# Patient Record
Sex: MALE | Race: WHITE | Employment: UNEMPLOYED | URBAN - METROPOLITAN AREA
[De-identification: names, ages, dates, MRNs, and addresses within clinical notes are randomized per-mention and may not be internally consistent; named-entity substitution may affect disease eponyms.]

---

## 2018-03-16 ENCOUNTER — APPOINTMENT (OUTPATIENT)
Dept: CT IMAGING | Age: 58
DRG: 136 | End: 2018-03-16
Payer: COMMERCIAL

## 2018-03-16 ENCOUNTER — HOSPITAL ENCOUNTER (INPATIENT)
Age: 58
LOS: 2 days | Discharge: HOSPICE | DRG: 136 | End: 2018-03-19
Attending: EMERGENCY MEDICINE | Admitting: INTERNAL MEDICINE
Payer: COMMERCIAL

## 2018-03-16 ENCOUNTER — APPOINTMENT (OUTPATIENT)
Dept: GENERAL RADIOLOGY | Age: 58
DRG: 136 | End: 2018-03-16
Payer: COMMERCIAL

## 2018-03-16 DIAGNOSIS — C79.9 METASTATIC CANCER (HCC): ICD-10-CM

## 2018-03-16 DIAGNOSIS — M89.8X9 BONE PAIN: ICD-10-CM

## 2018-03-16 DIAGNOSIS — A41.9: Primary | ICD-10-CM

## 2018-03-16 DIAGNOSIS — E87.6 HYPOKALEMIA: ICD-10-CM

## 2018-03-16 DIAGNOSIS — R64 WASTING SYNDROME (HCC): ICD-10-CM

## 2018-03-16 DIAGNOSIS — B20: Primary | ICD-10-CM

## 2018-03-16 DIAGNOSIS — E83.42 HYPOMAGNESEMIA: ICD-10-CM

## 2018-03-16 DIAGNOSIS — E87.20 LACTIC ACID INCREASED: ICD-10-CM

## 2018-03-16 DIAGNOSIS — I95.9 HYPOTENSION, UNSPECIFIED HYPOTENSION TYPE: ICD-10-CM

## 2018-03-16 LAB
% CKMB: 2.8 % (ref 0–3.5)
ABSOLUTE EOS #: 0 K/UL (ref 0–0.4)
ABSOLUTE IMMATURE GRANULOCYTE: ABNORMAL K/UL (ref 0–0.3)
ABSOLUTE LYMPH #: 2.4 K/UL (ref 1–4.8)
ABSOLUTE MONO #: 0.8 K/UL (ref 0.2–0.8)
ALBUMIN SERPL-MCNC: 2.5 G/DL (ref 3.5–5.2)
ALBUMIN/GLOBULIN RATIO: ABNORMAL (ref 1–2.5)
ALP BLD-CCNC: 299 U/L (ref 40–129)
ALT SERPL-CCNC: 57 U/L (ref 5–41)
AMMONIA: 39 UMOL/L (ref 16–60)
ANION GAP SERPL CALCULATED.3IONS-SCNC: 16 MMOL/L (ref 9–17)
AST SERPL-CCNC: 98 U/L
BASOPHILS # BLD: 0 % (ref 0–2)
BASOPHILS ABSOLUTE: 0 K/UL (ref 0–0.2)
BILIRUB SERPL-MCNC: 0.41 MG/DL (ref 0.3–1.2)
BILIRUBIN DIRECT: 0.19 MG/DL
BILIRUBIN, INDIRECT: 0.22 MG/DL (ref 0–1)
BUN BLDV-MCNC: 5 MG/DL (ref 6–20)
BUN/CREAT BLD: 11 (ref 9–20)
CALCIUM SERPL-MCNC: 7.1 MG/DL (ref 8.6–10.4)
CHLORIDE BLD-SCNC: 88 MMOL/L (ref 98–107)
CK MB: 1.6 NG/ML
CKMB INTERPRETATION: NORMAL
CO2: 25 MMOL/L (ref 20–31)
CREAT SERPL-MCNC: 0.45 MG/DL (ref 0.7–1.2)
DIFFERENTIAL TYPE: ABNORMAL
EKG ATRIAL RATE: 77 BPM
EKG P AXIS: 72 DEGREES
EKG P-R INTERVAL: 152 MS
EKG Q-T INTERVAL: 438 MS
EKG QRS DURATION: 104 MS
EKG QTC CALCULATION (BAZETT): 495 MS
EKG R AXIS: 83 DEGREES
EKG T AXIS: 74 DEGREES
EKG VENTRICULAR RATE: 77 BPM
EOSINOPHILS RELATIVE PERCENT: 0 % (ref 1–4)
GFR AFRICAN AMERICAN: >60 ML/MIN
GFR NON-AFRICAN AMERICAN: >60 ML/MIN
GFR SERPL CREATININE-BSD FRML MDRD: ABNORMAL ML/MIN/{1.73_M2}
GFR SERPL CREATININE-BSD FRML MDRD: ABNORMAL ML/MIN/{1.73_M2}
GLOBULIN: ABNORMAL G/DL (ref 1.5–3.8)
GLUCOSE BLD-MCNC: 74 MG/DL (ref 70–99)
HCT VFR BLD CALC: 27.3 % (ref 41–53)
HEMOGLOBIN: 9 G/DL (ref 13.5–17.5)
IMMATURE GRANULOCYTES: ABNORMAL %
LACTIC ACID: 4.6 MMOL/L (ref 0.5–2.2)
LYMPHOCYTES # BLD: 17 % (ref 24–44)
MAGNESIUM: 1.3 MG/DL (ref 1.6–2.6)
MCH RBC QN AUTO: 33.3 PG (ref 26–34)
MCHC RBC AUTO-ENTMCNC: 32.8 G/DL (ref 31–37)
MCV RBC AUTO: 101.3 FL (ref 80–100)
MONOCYTES # BLD: 6 % (ref 1–7)
MYOGLOBIN: 42 NG/ML (ref 28–72)
NRBC AUTOMATED: ABNORMAL PER 100 WBC
PDW BLD-RTO: 14.9 % (ref 11.5–14.5)
PHOSPHORUS: 1.8 MG/DL (ref 2.5–4.5)
PLATELET # BLD: 342 K/UL (ref 130–400)
PLATELET ESTIMATE: ABNORMAL
PMV BLD AUTO: 5.8 FL (ref 6–12)
POTASSIUM SERPL-SCNC: 2.4 MMOL/L (ref 3.7–5.3)
RBC # BLD: 2.69 M/UL (ref 4.5–5.9)
RBC # BLD: ABNORMAL 10*6/UL
SEG NEUTROPHILS: 77 % (ref 36–66)
SEGMENTED NEUTROPHILS ABSOLUTE COUNT: 10.5 K/UL (ref 1.8–7.7)
SODIUM BLD-SCNC: 129 MMOL/L (ref 135–144)
TOTAL CK: 58 U/L (ref 39–308)
TOTAL PROTEIN: 5 G/DL (ref 6.4–8.3)
TROPONIN INTERP: NORMAL
TROPONIN T: <0.03 NG/ML
WBC # BLD: 13.8 K/UL (ref 3.5–11)
WBC # BLD: ABNORMAL 10*3/UL

## 2018-03-16 PROCEDURE — 2580000003 HC RX 258: Performed by: NURSE PRACTITIONER

## 2018-03-16 PROCEDURE — 96365 THER/PROPH/DIAG IV INF INIT: CPT

## 2018-03-16 PROCEDURE — 84100 ASSAY OF PHOSPHORUS: CPT

## 2018-03-16 PROCEDURE — 83735 ASSAY OF MAGNESIUM: CPT

## 2018-03-16 PROCEDURE — 72125 CT NECK SPINE W/O DYE: CPT

## 2018-03-16 PROCEDURE — 82550 ASSAY OF CK (CPK): CPT

## 2018-03-16 PROCEDURE — 84484 ASSAY OF TROPONIN QUANT: CPT

## 2018-03-16 PROCEDURE — 05H333Z INSERTION OF INFUSION DEVICE INTO RIGHT INNOMINATE VEIN, PERCUTANEOUS APPROACH: ICD-10-PCS | Performed by: EMERGENCY MEDICINE

## 2018-03-16 PROCEDURE — 6360000002 HC RX W HCPCS: Performed by: NURSE PRACTITIONER

## 2018-03-16 PROCEDURE — 82140 ASSAY OF AMMONIA: CPT

## 2018-03-16 PROCEDURE — 80076 HEPATIC FUNCTION PANEL: CPT

## 2018-03-16 PROCEDURE — 96375 TX/PRO/DX INJ NEW DRUG ADDON: CPT

## 2018-03-16 PROCEDURE — 83874 ASSAY OF MYOGLOBIN: CPT

## 2018-03-16 PROCEDURE — 71045 X-RAY EXAM CHEST 1 VIEW: CPT

## 2018-03-16 PROCEDURE — 85025 COMPLETE CBC W/AUTO DIFF WBC: CPT

## 2018-03-16 PROCEDURE — 80048 BASIC METABOLIC PNL TOTAL CA: CPT

## 2018-03-16 PROCEDURE — 99285 EMERGENCY DEPT VISIT HI MDM: CPT

## 2018-03-16 PROCEDURE — 82553 CREATINE MB FRACTION: CPT

## 2018-03-16 PROCEDURE — 70450 CT HEAD/BRAIN W/O DYE: CPT

## 2018-03-16 PROCEDURE — 6360000002 HC RX W HCPCS: Performed by: EMERGENCY MEDICINE

## 2018-03-16 PROCEDURE — 93005 ELECTROCARDIOGRAM TRACING: CPT

## 2018-03-16 PROCEDURE — 83605 ASSAY OF LACTIC ACID: CPT

## 2018-03-16 PROCEDURE — 87040 BLOOD CULTURE FOR BACTERIA: CPT

## 2018-03-16 RX ORDER — MAGNESIUM OXIDE 400 MG/1
400 TABLET ORAL DAILY
COMMUNITY

## 2018-03-16 RX ORDER — MAGNESIUM SULFATE 1 G/100ML
1 INJECTION INTRAVENOUS
Status: DISPENSED | OUTPATIENT
Start: 2018-03-16 | End: 2018-03-17

## 2018-03-16 RX ORDER — DEXAMETHASONE 2 MG/1
2 TABLET ORAL 2 TIMES DAILY WITH MEALS
Status: ON HOLD | COMMUNITY
End: 2018-03-19

## 2018-03-16 RX ORDER — 0.9 % SODIUM CHLORIDE 0.9 %
30 INTRAVENOUS SOLUTION INTRAVENOUS ONCE
Status: COMPLETED | OUTPATIENT
Start: 2018-03-16 | End: 2018-03-17

## 2018-03-16 RX ORDER — POTASSIUM CHLORIDE 7.45 MG/ML
10 INJECTION INTRAVENOUS ONCE
Status: COMPLETED | OUTPATIENT
Start: 2018-03-16 | End: 2018-03-17

## 2018-03-16 RX ORDER — 0.9 % SODIUM CHLORIDE 0.9 %
500 INTRAVENOUS SOLUTION INTRAVENOUS ONCE
Status: DISCONTINUED | OUTPATIENT
Start: 2018-03-16 | End: 2018-03-16

## 2018-03-16 RX ORDER — OMEPRAZOLE 20 MG/1
20 CAPSULE, DELAYED RELEASE ORAL DAILY
COMMUNITY

## 2018-03-16 RX ORDER — POTASSIUM BICARBONATE 25 MEQ/1
25 TABLET, EFFERVESCENT ORAL ONCE
Status: DISCONTINUED | OUTPATIENT
Start: 2018-03-16 | End: 2018-03-19 | Stop reason: HOSPADM

## 2018-03-16 RX ORDER — FENTANYL CITRATE 50 UG/ML
50 INJECTION, SOLUTION INTRAMUSCULAR; INTRAVENOUS ONCE
Status: COMPLETED | OUTPATIENT
Start: 2018-03-16 | End: 2018-03-16

## 2018-03-16 RX ORDER — POTASSIUM CHLORIDE AND SODIUM CHLORIDE 900; 300 MG/100ML; MG/100ML
INJECTION, SOLUTION INTRAVENOUS CONTINUOUS
Status: DISCONTINUED | OUTPATIENT
Start: 2018-03-16 | End: 2018-03-19 | Stop reason: HOSPADM

## 2018-03-16 RX ORDER — ONDANSETRON 2 MG/ML
4 INJECTION INTRAMUSCULAR; INTRAVENOUS ONCE
Status: COMPLETED | OUTPATIENT
Start: 2018-03-16 | End: 2018-03-16

## 2018-03-16 RX ORDER — POTASSIUM CHLORIDE 750 MG/1
10 CAPSULE, EXTENDED RELEASE ORAL 2 TIMES DAILY
Status: ON HOLD | COMMUNITY
End: 2018-03-17 | Stop reason: ALTCHOICE

## 2018-03-16 RX ORDER — MORPHINE SULFATE 30 MG/1
30 TABLET ORAL 2 TIMES DAILY
Status: ON HOLD | COMMUNITY
End: 2018-03-17

## 2018-03-16 RX ORDER — 0.9 % SODIUM CHLORIDE 0.9 %
30 INTRAVENOUS SOLUTION INTRAVENOUS ONCE
Status: DISCONTINUED | OUTPATIENT
Start: 2018-03-16 | End: 2018-03-16

## 2018-03-16 RX ORDER — GABAPENTIN 800 MG/1
800 TABLET ORAL 3 TIMES DAILY
Status: ON HOLD | COMMUNITY
End: 2018-03-17 | Stop reason: DRUGHIGH

## 2018-03-16 RX ADMIN — FENTANYL CITRATE 50 MCG: 50 INJECTION, SOLUTION INTRAMUSCULAR; INTRAVENOUS at 23:27

## 2018-03-16 RX ADMIN — SODIUM CHLORIDE 1338 ML: 9 INJECTION, SOLUTION INTRAVENOUS at 22:45

## 2018-03-16 RX ADMIN — ONDANSETRON 4 MG: 2 INJECTION INTRAMUSCULAR; INTRAVENOUS at 22:40

## 2018-03-16 RX ADMIN — HYDROCORTISONE SODIUM SUCCINATE 100 MG: 100 INJECTION, POWDER, FOR SOLUTION INTRAMUSCULAR; INTRAVENOUS at 22:44

## 2018-03-16 RX ADMIN — MAGNESIUM SULFATE HEPTAHYDRATE 1 G: 1 INJECTION, SOLUTION INTRAVENOUS at 23:53

## 2018-03-16 RX ADMIN — SODIUM CHLORIDE 1338 ML: 9 INJECTION, SOLUTION INTRAVENOUS at 22:39

## 2018-03-16 ASSESSMENT — ENCOUNTER SYMPTOMS
SHORTNESS OF BREATH: 0
SORE THROAT: 0
BACK PAIN: 1
COLOR CHANGE: 0
ABDOMINAL PAIN: 0
DIARRHEA: 0
EYE PAIN: 0
PHOTOPHOBIA: 0
COUGH: 0

## 2018-03-16 ASSESSMENT — PAIN DESCRIPTION - LOCATION: LOCATION: GENERALIZED

## 2018-03-16 ASSESSMENT — PAIN DESCRIPTION - PAIN TYPE: TYPE: ACUTE PAIN

## 2018-03-16 ASSESSMENT — PAIN DESCRIPTION - DESCRIPTORS: DESCRIPTORS: ACHING

## 2018-03-16 ASSESSMENT — PAIN SCALES - GENERAL
PAINLEVEL_OUTOF10: 6
PAINLEVEL_OUTOF10: 10

## 2018-03-17 PROBLEM — M89.8X9 BONE PAIN: Status: ACTIVE | Noted: 2017-04-01

## 2018-03-17 PROBLEM — E87.1 HYPONATREMIA: Status: ACTIVE | Noted: 2018-03-17

## 2018-03-17 PROBLEM — E83.42 HYPOMAGNESEMIA: Status: ACTIVE | Noted: 2018-03-17

## 2018-03-17 PROBLEM — E83.39 HYPOPHOSPHATEMIA: Status: ACTIVE | Noted: 2018-03-17

## 2018-03-17 PROBLEM — Z72.0 TOBACCO USE: Status: ACTIVE | Noted: 2018-03-17

## 2018-03-17 PROBLEM — D53.9 MACROCYTIC ANEMIA: Status: ACTIVE | Noted: 2018-03-17

## 2018-03-17 PROBLEM — R79.89 ELEVATED LACTIC ACID LEVEL: Status: ACTIVE | Noted: 2018-03-17

## 2018-03-17 PROBLEM — A41.9 SEPSIS (HCC): Status: ACTIVE | Noted: 2018-03-17

## 2018-03-17 PROBLEM — C79.51 SECONDARY MALIGNANT NEOPLASM OF BONE (HCC): Status: ACTIVE | Noted: 2017-09-29

## 2018-03-17 PROBLEM — E43 SEVERE MALNUTRITION (HCC): Status: ACTIVE | Noted: 2018-03-17

## 2018-03-17 PROBLEM — I95.1 ORTHOSTATIC HYPOTENSION: Status: ACTIVE | Noted: 2018-03-17

## 2018-03-17 PROBLEM — I95.9 HYPOTENSION: Status: ACTIVE | Noted: 2018-03-17

## 2018-03-17 PROBLEM — E87.6 HYPOKALEMIA: Status: ACTIVE | Noted: 2018-03-17

## 2018-03-17 PROBLEM — E88.09 HYPOALBUMINEMIA DUE TO PROTEIN-CALORIE MALNUTRITION (HCC): Status: ACTIVE | Noted: 2018-03-17

## 2018-03-17 PROBLEM — C34.12 PRIMARY MALIGNANT NEOPLASM OF LEFT UPPER LOBE OF LUNG (HCC): Status: ACTIVE | Noted: 2017-09-29

## 2018-03-17 PROBLEM — R74.01 TRANSAMINASEMIA: Status: ACTIVE | Noted: 2018-03-17

## 2018-03-17 PROBLEM — C34.90 STAGE 4 MALIGNANT NEOPLASM OF LUNG (HCC): Status: ACTIVE | Noted: 2017-05-11

## 2018-03-17 PROBLEM — R64 CACHEXIA (HCC): Status: ACTIVE | Noted: 2018-03-17

## 2018-03-17 PROBLEM — E46 HYPOALBUMINEMIA DUE TO PROTEIN-CALORIE MALNUTRITION (HCC): Status: ACTIVE | Noted: 2018-03-17

## 2018-03-17 LAB
ABSOLUTE EOS #: 0 K/UL (ref 0–0.4)
ABSOLUTE IMMATURE GRANULOCYTE: ABNORMAL K/UL (ref 0–0.3)
ABSOLUTE LYMPH #: 0.9 K/UL (ref 1–4.8)
ABSOLUTE MONO #: 0.7 K/UL (ref 0.2–0.8)
ALBUMIN SERPL-MCNC: 2.2 G/DL (ref 3.5–5.2)
BASOPHILS # BLD: 0 % (ref 0–2)
BASOPHILS ABSOLUTE: 0 K/UL (ref 0–0.2)
BILIRUBIN URINE: NEGATIVE
COLOR: YELLOW
COMMENT UA: ABNORMAL
DIFFERENTIAL TYPE: ABNORMAL
EOSINOPHILS RELATIVE PERCENT: 0 % (ref 1–4)
GLUCOSE URINE: NEGATIVE
HCT VFR BLD CALC: 24 % (ref 41–53)
HEMOGLOBIN: 8.2 G/DL (ref 13.5–17.5)
IMMATURE GRANULOCYTES: ABNORMAL %
INR BLD: 1.3
KETONES, URINE: NEGATIVE
LACTIC ACID, SEPSIS WHOLE BLOOD: ABNORMAL MMOL/L (ref 0.5–1.9)
LACTIC ACID, SEPSIS WHOLE BLOOD: ABNORMAL MMOL/L (ref 0.5–1.9)
LACTIC ACID, SEPSIS: 2.7 MMOL/L (ref 0.5–1.9)
LACTIC ACID, SEPSIS: 3.1 MMOL/L (ref 0.5–1.9)
LACTIC ACID: 2.9 MMOL/L (ref 0.5–2.2)
LEUKOCYTE ESTERASE, URINE: NEGATIVE
LV EF: 55 %
LVEF MODALITY: NORMAL
LYMPHOCYTES # BLD: 5 % (ref 24–44)
MAGNESIUM: 1.6 MG/DL (ref 1.6–2.6)
MCH RBC QN AUTO: 34.6 PG (ref 26–34)
MCHC RBC AUTO-ENTMCNC: 34.2 G/DL (ref 31–37)
MCV RBC AUTO: 101.2 FL (ref 80–100)
MONOCYTES # BLD: 4 % (ref 1–7)
NITRITE, URINE: NEGATIVE
NRBC AUTOMATED: ABNORMAL PER 100 WBC
PDW BLD-RTO: 15 % (ref 11.5–14.5)
PH UA: 7 (ref 5–8)
PLATELET # BLD: 324 K/UL (ref 130–400)
PLATELET ESTIMATE: ABNORMAL
PMV BLD AUTO: 5.5 FL (ref 6–12)
POTASSIUM SERPL-SCNC: 2.9 MMOL/L (ref 3.7–5.3)
POTASSIUM SERPL-SCNC: 3.3 MMOL/L (ref 3.7–5.3)
POTASSIUM SERPL-SCNC: 3.8 MMOL/L (ref 3.7–5.3)
PROTEIN UA: NEGATIVE
PROTHROMBIN TIME: 13.3 SEC (ref 9.7–11.6)
RBC # BLD: 2.37 M/UL (ref 4.5–5.9)
RBC # BLD: ABNORMAL 10*6/UL
SEG NEUTROPHILS: 91 % (ref 36–66)
SEGMENTED NEUTROPHILS ABSOLUTE COUNT: 17.8 K/UL (ref 1.8–7.7)
SERUM OSMOLALITY: 276 MOSM/KG (ref 282–298)
SODIUM BLD-SCNC: 133 MMOL/L (ref 135–144)
SODIUM BLD-SCNC: 135 MMOL/L (ref 135–144)
SPECIFIC GRAVITY UA: 1 (ref 1–1.03)
TROPONIN INTERP: NORMAL
TROPONIN T: <0.03 NG/ML
TSH SERPL DL<=0.05 MIU/L-ACNC: 0.65 MIU/L (ref 0.3–5)
TURBIDITY: CLEAR
URINE HGB: NEGATIVE
UROBILINOGEN, URINE: NORMAL
WBC # BLD: 19.5 K/UL (ref 3.5–11)
WBC # BLD: ABNORMAL 10*3/UL

## 2018-03-17 PROCEDURE — 96368 THER/DIAG CONCURRENT INF: CPT

## 2018-03-17 PROCEDURE — 85025 COMPLETE CBC W/AUTO DIFF WBC: CPT

## 2018-03-17 PROCEDURE — 81003 URINALYSIS AUTO W/O SCOPE: CPT

## 2018-03-17 PROCEDURE — 83930 ASSAY OF BLOOD OSMOLALITY: CPT

## 2018-03-17 PROCEDURE — 6360000002 HC RX W HCPCS: Performed by: NURSE PRACTITIONER

## 2018-03-17 PROCEDURE — 6370000000 HC RX 637 (ALT 250 FOR IP): Performed by: NURSE PRACTITIONER

## 2018-03-17 PROCEDURE — 2580000003 HC RX 258: Performed by: INTERNAL MEDICINE

## 2018-03-17 PROCEDURE — 84132 ASSAY OF SERUM POTASSIUM: CPT

## 2018-03-17 PROCEDURE — 83605 ASSAY OF LACTIC ACID: CPT

## 2018-03-17 PROCEDURE — 83735 ASSAY OF MAGNESIUM: CPT

## 2018-03-17 PROCEDURE — 96375 TX/PRO/DX INJ NEW DRUG ADDON: CPT

## 2018-03-17 PROCEDURE — 6360000002 HC RX W HCPCS: Performed by: INTERNAL MEDICINE

## 2018-03-17 PROCEDURE — 6370000000 HC RX 637 (ALT 250 FOR IP): Performed by: INTERNAL MEDICINE

## 2018-03-17 PROCEDURE — 82040 ASSAY OF SERUM ALBUMIN: CPT

## 2018-03-17 PROCEDURE — 93306 TTE W/DOPPLER COMPLETE: CPT

## 2018-03-17 PROCEDURE — 6360000002 HC RX W HCPCS: Performed by: EMERGENCY MEDICINE

## 2018-03-17 PROCEDURE — 2700000000 HC OXYGEN THERAPY PER DAY

## 2018-03-17 PROCEDURE — 85610 PROTHROMBIN TIME: CPT

## 2018-03-17 PROCEDURE — 84484 ASSAY OF TROPONIN QUANT: CPT

## 2018-03-17 PROCEDURE — 84295 ASSAY OF SERUM SODIUM: CPT

## 2018-03-17 PROCEDURE — 2580000003 HC RX 258: Performed by: EMERGENCY MEDICINE

## 2018-03-17 PROCEDURE — 99223 1ST HOSP IP/OBS HIGH 75: CPT | Performed by: INTERNAL MEDICINE

## 2018-03-17 PROCEDURE — 84443 ASSAY THYROID STIM HORMONE: CPT

## 2018-03-17 PROCEDURE — 94761 N-INVAS EAR/PLS OXIMETRY MLT: CPT

## 2018-03-17 PROCEDURE — 2580000003 HC RX 258: Performed by: NURSE PRACTITIONER

## 2018-03-17 PROCEDURE — 36415 COLL VENOUS BLD VENIPUNCTURE: CPT

## 2018-03-17 PROCEDURE — 2500000003 HC RX 250 WO HCPCS: Performed by: NURSE PRACTITIONER

## 2018-03-17 PROCEDURE — 2500000003 HC RX 250 WO HCPCS: Performed by: EMERGENCY MEDICINE

## 2018-03-17 PROCEDURE — 2060000000 HC ICU INTERMEDIATE R&B

## 2018-03-17 RX ORDER — POTASSIUM CHLORIDE 29.8 MG/ML
20 INJECTION INTRAVENOUS
Status: COMPLETED | OUTPATIENT
Start: 2018-03-17 | End: 2018-03-17

## 2018-03-17 RX ORDER — LORAZEPAM 2 MG/ML
3 INJECTION INTRAMUSCULAR
Status: DISCONTINUED | OUTPATIENT
Start: 2018-03-17 | End: 2018-03-19 | Stop reason: HOSPADM

## 2018-03-17 RX ORDER — MORPHINE SULFATE 15 MG/1
45 TABLET, FILM COATED, EXTENDED RELEASE ORAL EVERY 12 HOURS SCHEDULED
Status: DISCONTINUED | OUTPATIENT
Start: 2018-03-17 | End: 2018-03-19 | Stop reason: HOSPADM

## 2018-03-17 RX ORDER — ONDANSETRON 4 MG/1
4 TABLET, ORALLY DISINTEGRATING ORAL EVERY 8 HOURS PRN
COMMUNITY

## 2018-03-17 RX ORDER — LORAZEPAM 2 MG/ML
2 INJECTION INTRAMUSCULAR
Status: DISCONTINUED | OUTPATIENT
Start: 2018-03-17 | End: 2018-03-19 | Stop reason: HOSPADM

## 2018-03-17 RX ORDER — VENLAFAXINE HYDROCHLORIDE 75 MG/1
75 CAPSULE, EXTENDED RELEASE ORAL DAILY
COMMUNITY

## 2018-03-17 RX ORDER — MORPHINE SULFATE 30 MG/1
45 TABLET ORAL 2 TIMES DAILY
Status: DISCONTINUED | OUTPATIENT
Start: 2018-03-17 | End: 2018-03-17

## 2018-03-17 RX ORDER — DEXAMETHASONE 4 MG/1
2 TABLET ORAL 2 TIMES DAILY WITH MEALS
Status: DISCONTINUED | OUTPATIENT
Start: 2018-03-17 | End: 2018-03-19 | Stop reason: HOSPADM

## 2018-03-17 RX ORDER — GABAPENTIN 400 MG/1
800 CAPSULE ORAL 3 TIMES DAILY
Status: DISCONTINUED | OUTPATIENT
Start: 2018-03-17 | End: 2018-03-19 | Stop reason: HOSPADM

## 2018-03-17 RX ORDER — MIRTAZAPINE 15 MG/1
15 TABLET, ORALLY DISINTEGRATING ORAL NIGHTLY
Status: ON HOLD | COMMUNITY
End: 2018-03-19

## 2018-03-17 RX ORDER — LORAZEPAM 2 MG/ML
1 INJECTION INTRAMUSCULAR
Status: DISCONTINUED | OUTPATIENT
Start: 2018-03-17 | End: 2018-03-19 | Stop reason: HOSPADM

## 2018-03-17 RX ORDER — OXYCODONE HYDROCHLORIDE 5 MG/1
5 TABLET ORAL EVERY 4 HOURS PRN
Status: DISCONTINUED | OUTPATIENT
Start: 2018-03-17 | End: 2018-03-17

## 2018-03-17 RX ORDER — LORAZEPAM 1 MG/1
1 TABLET ORAL
Status: DISCONTINUED | OUTPATIENT
Start: 2018-03-17 | End: 2018-03-19 | Stop reason: HOSPADM

## 2018-03-17 RX ORDER — ACETAMINOPHEN 325 MG/1
650 TABLET ORAL EVERY 4 HOURS PRN
Status: DISCONTINUED | OUTPATIENT
Start: 2018-03-17 | End: 2018-03-19 | Stop reason: HOSPADM

## 2018-03-17 RX ORDER — GABAPENTIN 300 MG/1
900 CAPSULE ORAL 3 TIMES DAILY
COMMUNITY

## 2018-03-17 RX ORDER — SODIUM CHLORIDE 0.9 % (FLUSH) 0.9 %
10 SYRINGE (ML) INJECTION EVERY 12 HOURS SCHEDULED
Status: DISCONTINUED | OUTPATIENT
Start: 2018-03-17 | End: 2018-03-19 | Stop reason: HOSPADM

## 2018-03-17 RX ORDER — LORAZEPAM 1 MG/1
3 TABLET ORAL
Status: DISCONTINUED | OUTPATIENT
Start: 2018-03-17 | End: 2018-03-19 | Stop reason: HOSPADM

## 2018-03-17 RX ORDER — MORPHINE SULFATE 30 MG/1
45 TABLET, FILM COATED, EXTENDED RELEASE ORAL 2 TIMES DAILY
Status: ON HOLD | COMMUNITY
End: 2018-03-19

## 2018-03-17 RX ORDER — PANTOPRAZOLE SODIUM 40 MG/1
40 TABLET, DELAYED RELEASE ORAL
Status: DISCONTINUED | OUTPATIENT
Start: 2018-03-17 | End: 2018-03-19 | Stop reason: HOSPADM

## 2018-03-17 RX ORDER — LORAZEPAM 1 MG/1
4 TABLET ORAL
Status: DISCONTINUED | OUTPATIENT
Start: 2018-03-17 | End: 2018-03-19 | Stop reason: HOSPADM

## 2018-03-17 RX ORDER — OXYCODONE HYDROCHLORIDE AND ACETAMINOPHEN 5; 325 MG/1; MG/1
1 TABLET ORAL EVERY 4 HOURS PRN
Status: ON HOLD | COMMUNITY
End: 2018-03-19

## 2018-03-17 RX ORDER — OXYCODONE HYDROCHLORIDE AND ACETAMINOPHEN 5; 325 MG/1; MG/1
1 TABLET ORAL EVERY 4 HOURS PRN
Status: DISCONTINUED | OUTPATIENT
Start: 2018-03-17 | End: 2018-03-19 | Stop reason: HOSPADM

## 2018-03-17 RX ORDER — MAGNESIUM SULFATE 1 G/100ML
1 INJECTION INTRAVENOUS PRN
Status: DISCONTINUED | OUTPATIENT
Start: 2018-03-17 | End: 2018-03-19 | Stop reason: HOSPADM

## 2018-03-17 RX ORDER — MIRTAZAPINE 15 MG/1
15 TABLET, ORALLY DISINTEGRATING ORAL NIGHTLY
Status: DISCONTINUED | OUTPATIENT
Start: 2018-03-17 | End: 2018-03-19 | Stop reason: HOSPADM

## 2018-03-17 RX ORDER — MORPHINE SULFATE 30 MG/1
30 TABLET ORAL 2 TIMES DAILY
Status: DISCONTINUED | OUTPATIENT
Start: 2018-03-17 | End: 2018-03-17

## 2018-03-17 RX ORDER — LORAZEPAM 2 MG/ML
4 INJECTION INTRAMUSCULAR
Status: DISCONTINUED | OUTPATIENT
Start: 2018-03-17 | End: 2018-03-19 | Stop reason: HOSPADM

## 2018-03-17 RX ORDER — VENLAFAXINE HYDROCHLORIDE 75 MG/1
75 CAPSULE, EXTENDED RELEASE ORAL DAILY
Status: DISCONTINUED | OUTPATIENT
Start: 2018-03-17 | End: 2018-03-19 | Stop reason: HOSPADM

## 2018-03-17 RX ORDER — MAGNESIUM OXIDE 400 MG/1
400 TABLET ORAL DAILY
Status: DISCONTINUED | OUTPATIENT
Start: 2018-03-17 | End: 2018-03-17 | Stop reason: CLARIF

## 2018-03-17 RX ORDER — POTASSIUM CHLORIDE 7.45 MG/ML
20 INJECTION INTRAVENOUS PRN
Status: DISCONTINUED | OUTPATIENT
Start: 2018-03-17 | End: 2018-03-17 | Stop reason: SDUPTHER

## 2018-03-17 RX ORDER — POTASSIUM CHLORIDE 7.45 MG/ML
10 INJECTION INTRAVENOUS PRN
Status: DISCONTINUED | OUTPATIENT
Start: 2018-03-17 | End: 2018-03-19 | Stop reason: HOSPADM

## 2018-03-17 RX ORDER — LORAZEPAM 1 MG/1
2 TABLET ORAL
Status: DISCONTINUED | OUTPATIENT
Start: 2018-03-17 | End: 2018-03-19 | Stop reason: HOSPADM

## 2018-03-17 RX ORDER — MAGNESIUM SULFATE 1 G/100ML
1 INJECTION INTRAVENOUS ONCE
Status: COMPLETED | OUTPATIENT
Start: 2018-03-17 | End: 2018-03-17

## 2018-03-17 RX ORDER — SODIUM CHLORIDE 0.9 % (FLUSH) 0.9 %
10 SYRINGE (ML) INJECTION PRN
Status: DISCONTINUED | OUTPATIENT
Start: 2018-03-17 | End: 2018-03-19 | Stop reason: HOSPADM

## 2018-03-17 RX ORDER — ONDANSETRON 4 MG/1
4 TABLET, ORALLY DISINTEGRATING ORAL EVERY 8 HOURS PRN
Status: DISCONTINUED | OUTPATIENT
Start: 2018-03-17 | End: 2018-03-19 | Stop reason: HOSPADM

## 2018-03-17 RX ORDER — NICOTINE 21 MG/24HR
1 PATCH, TRANSDERMAL 24 HOURS TRANSDERMAL DAILY
Status: DISCONTINUED | OUTPATIENT
Start: 2018-03-17 | End: 2018-03-19 | Stop reason: HOSPADM

## 2018-03-17 RX ADMIN — LORAZEPAM 1 MG: 1 TABLET ORAL at 17:36

## 2018-03-17 RX ADMIN — POTASSIUM CHLORIDE AND SODIUM CHLORIDE: 900; 300 INJECTION, SOLUTION INTRAVENOUS at 22:40

## 2018-03-17 RX ADMIN — Medication 400 MG: at 09:26

## 2018-03-17 RX ADMIN — LORAZEPAM 1 MG: 1 TABLET ORAL at 11:15

## 2018-03-17 RX ADMIN — PANTOPRAZOLE SODIUM 40 MG: 40 TABLET, DELAYED RELEASE ORAL at 09:26

## 2018-03-17 RX ADMIN — POTASSIUM CHLORIDE 20 MEQ: 400 INJECTION, SOLUTION INTRAVENOUS at 12:19

## 2018-03-17 RX ADMIN — POTASSIUM CHLORIDE 20 MEQ: 400 INJECTION, SOLUTION INTRAVENOUS at 13:15

## 2018-03-17 RX ADMIN — MORPHINE SULFATE 45 MG: 15 TABLET, FILM COATED, EXTENDED RELEASE ORAL at 12:19

## 2018-03-17 RX ADMIN — OXYCODONE HYDROCHLORIDE AND ACETAMINOPHEN 1 TABLET: 5; 325 TABLET ORAL at 20:24

## 2018-03-17 RX ADMIN — CEFEPIME HYDROCHLORIDE 1 G: 1 INJECTION, POWDER, FOR SOLUTION INTRAMUSCULAR; INTRAVENOUS at 12:20

## 2018-03-17 RX ADMIN — GABAPENTIN 800 MG: 400 CAPSULE ORAL at 20:23

## 2018-03-17 RX ADMIN — Medication 10 ML: at 09:00

## 2018-03-17 RX ADMIN — CEFEPIME HYDROCHLORIDE 1 G: 1 INJECTION, POWDER, FOR SOLUTION INTRAMUSCULAR; INTRAVENOUS at 00:25

## 2018-03-17 RX ADMIN — DEXAMETHASONE 2 MG: 4 TABLET ORAL at 17:28

## 2018-03-17 RX ADMIN — POTASSIUM CHLORIDE 20 MEQ: 400 INJECTION, SOLUTION INTRAVENOUS at 14:14

## 2018-03-17 RX ADMIN — MIRTAZAPINE 15 MG: 15 TABLET, ORALLY DISINTEGRATING ORAL at 20:24

## 2018-03-17 RX ADMIN — NOREPINEPHRINE BITARTRATE 2 MCG/MIN: 1 INJECTION INTRAVENOUS at 00:03

## 2018-03-17 RX ADMIN — ENOXAPARIN SODIUM 40 MG: 40 INJECTION SUBCUTANEOUS at 09:26

## 2018-03-17 RX ADMIN — Medication 10 ML: at 20:24

## 2018-03-17 RX ADMIN — LORAZEPAM 2 MG: 2 INJECTION INTRAMUSCULAR; INTRAVENOUS at 04:39

## 2018-03-17 RX ADMIN — POTASSIUM CHLORIDE AND SODIUM CHLORIDE 100 ML/HR: 900; 300 INJECTION, SOLUTION INTRAVENOUS at 00:11

## 2018-03-17 RX ADMIN — MAGNESIUM SULFATE HEPTAHYDRATE 1 G: 1 INJECTION, SOLUTION INTRAVENOUS at 10:57

## 2018-03-17 RX ADMIN — FOLIC ACID: 5 INJECTION, SOLUTION INTRAMUSCULAR; INTRAVENOUS; SUBCUTANEOUS at 09:27

## 2018-03-17 RX ADMIN — POTASSIUM CHLORIDE AND SODIUM CHLORIDE: 900; 300 INJECTION, SOLUTION INTRAVENOUS at 10:55

## 2018-03-17 RX ADMIN — DEXAMETHASONE 2 MG: 4 TABLET ORAL at 09:25

## 2018-03-17 RX ADMIN — OXYCODONE HYDROCHLORIDE 5 MG: 5 TABLET ORAL at 11:15

## 2018-03-17 RX ADMIN — POTASSIUM CHLORIDE 10 MEQ: 7.46 INJECTION, SOLUTION INTRAVENOUS at 00:11

## 2018-03-17 RX ADMIN — VENLAFAXINE HYDROCHLORIDE 75 MG: 75 CAPSULE, EXTENDED RELEASE ORAL at 12:20

## 2018-03-17 RX ADMIN — GABAPENTIN 800 MG: 400 CAPSULE ORAL at 09:26

## 2018-03-17 RX ADMIN — GABAPENTIN 800 MG: 400 CAPSULE ORAL at 13:15

## 2018-03-17 ASSESSMENT — ENCOUNTER SYMPTOMS
NAUSEA: 0
COUGH: 1
VOMITING: 0
WHEEZING: 0
HEARTBURN: 0
HEMOPTYSIS: 0
SPUTUM PRODUCTION: 0
SHORTNESS OF BREATH: 1
VOMITING: 1
NAUSEA: 1
BACK PAIN: 1

## 2018-03-17 ASSESSMENT — PAIN SCALES - GENERAL
PAINLEVEL_OUTOF10: 10
PAINLEVEL_OUTOF10: 8
PAINLEVEL_OUTOF10: 10
PAINLEVEL_OUTOF10: 0
PAINLEVEL_OUTOF10: 10
PAINLEVEL_OUTOF10: 6
PAINLEVEL_OUTOF10: 0
PAINLEVEL_OUTOF10: 7
PAINLEVEL_OUTOF10: 8
PAINLEVEL_OUTOF10: 0

## 2018-03-17 NOTE — PROGRESS NOTES
Pharmacy Accuracy Service Medication History Note    The patient's list of current home medications has been reviewed. Source(s) of information: [facility] [place] (patient is not able to give medication history)    Based on information provided by the above source(s), I have updated the patient's home med list as described below. Please review the ACTION REQUESTED BY PHYSICIAN section of this note below for any discrepancies on current hospital orders. I changed or updated the following medications on the patient's home medication list:  Discontinued · Potassium 1-meq BID   · Oxycodone ER - no sig. Pt is on MS contin     Added · Percocet 5-325 1q4h prn  · Remeron 15mg nightly  · Zofran ODT 4mg q8h prn  · Effexor ER 75mg daily (last filled 1/8/18, pt is likely noncompliant)     Adjusted   · Gabapentin 800mg TID adjusted to 900mg TID  · MSIR 30mg BID adjusted to MS contin ER 45mg BID (pt takes 30mg and 15mg tabs to equal 45mg BID)       PHYSICIAN ACTION REQUESTED  Discrepancies on current hospital orders that need to be addressed by a physician:    Medication Action Requested     Morphine    Gabapentin    Remeron, Effexor    Oxycodone       Pt takes ER 45mg BID, please adjust (ordered as IR 30mg BID)    Pt takes 900mg TID, please adjust (ordered as 800mg TID)    Please reconcile    Pt is on Percocet 5-325 at home, oxycodone 5mg is ordered. Please adjust if appropriate. Please feel free to call me with any questions about this encounter. Thank you.     Tobias Vides PharmD  Pharmacy Medication Accuracy Review Service  Phone:  [phone]  Fax: [phone]      Electronically signed by Tobias Vides, [address] [place] [address] on 3/17/2018 at 10:30 AM

## 2018-03-17 NOTE — ED PROVIDER NOTES
Abdominal: Soft. He exhibits no distension. There is no tenderness. There is no rebound and no guarding. Musculoskeletal: He exhibits no edema. Neurological: He is alert and oriented to person, place, and time. He has normal strength. No cranial nerve deficit. Coordination normal. GCS eye subscore is 4. GCS verbal subscore is 5. GCS motor subscore is 6. Skin: Skin is warm and dry. No rash noted. He is not diaphoretic. Psychiatric: He has a normal mood and affect. His behavior is normal.   Vitals reviewed. DIAGNOSTIC RESULTS     EKG: All EKG's are interpreted by the Emergency Department Physician who either signs or Co-signs this chart in the absence of a cardiologist.  EKG was interpreted by Dr. Ly Mitchell after completion. RADIOLOGY:   Non-plain film images such as CT, Ultrasound and MRI are read by the radiologist. Plain radiographic images are visualized and preliminarily interpreted by the emergency physician with the below findings:    Interpretation per the Radiologist below, if available at the time of this note:    Ct Head Wo Contrast    Result Date: 3/16/2018  No acute intracranial abnormality. Findings suggestive of chronic small vessel ischemic disease     Ct Cervical Spine Wo Contrast    Result Date: 3/16/2018  EXAMINATION: CT OF THE CERVICAL SPINE WITHOUT CONTRAST 3/16/2018 11:12 pm TECHNIQUE: CT of the cervical spine was performed without the administration of intravenous contrast. Multiplanar reformatted images are provided for review. Dose modulation, iterative reconstruction, and/or weight based adjustment of the mA/kV was utilized to reduce the radiation dose to as low as reasonably achievable. COMPARISON: None. HISTORY: ORDERING SYSTEM PROVIDED HISTORY: fall FINDINGS: BONES/ALIGNMENT: There is no evidence of an acute cervical spine fracture. There is normal alignment of the cervical spine. DEGENERATIVE CHANGES: No significant degenerative changes.  SOFT TISSUES: There is no further evaluation and treatment. He has not been able to provided a urine at this time. Family states the patient is an alcoholic. CONSULTS:  IP CONSULT TO INTERNAL MEDICINE      Provider Repeat Focused Exam    Vitals:    03/16/18 2318   BP:    Pulse: 76   Resp: 14   Temp:    SpO2: 100%       Focused assessment of respiratory, cardiovascular, and skin was performed as documented below:    Lungs:  Normal expansion. Clear to auscultation. No rales, rhonchi, or wheezing. Heart:  Heart sounds are normal.  Regular rate and rhythm without murmur, gallop or rub. Peripheral Pulses:  Normal    Skin:  Skin color, texture, turgor normal. No rashes or lesions. Capillary Refill Time: normal      Electronically signed by Zarina Jacobo NP on 3/17/2018 at 12:03 AM    FINAL IMPRESSION      1. Sepsis associated with acquired immune deficiency syndrome (AIDS) ([facility]    2. Metastatic cancer ([facility]    3. Hypotension, unspecified hypotension type    4. Lactic acid increased    5. Hypomagnesemia    6. Hypokalemia    7. Wasting syndrome [facility])            DISPOSITION/PLAN   DISPOSITION Decision To Admit 03/16/2018 11:46:53 PM      PATIENT REFERRED TO:   No follow-up provider specified.     DISCHARGE MEDICATIONS:     New Prescriptions    No medications on file           (Please note that portions of this note were completed with a voice recognition program.  Efforts were made to edit the dictations but occasionally words are mis-transcribed.)    JEFFERSON Marte NP  03/17/18 1959 [facility], NP  03/17/18 [id number]

## 2018-03-17 NOTE — ED NOTES
Pt presents to er c/o generalized body aches.  Pt has stage 4 lung cancer pt appears very emaciated and thin respirations are even and unlabored lungs are with coarse breath sounds bilaterally pt states that he has hardly drank or ate anything in the past five days     Beata Guest, CARLOS  03/16/18 Rukhsana Craven Ask, [address] [facility]  03/16/18 [id number]

## 2018-03-17 NOTE — PROGRESS NOTES
Nutrition Assessment    Type and Reason for Visit: Initial, Consult    Nutrition Recommendations: 1. Suggest continue current General diet, dental soft. 2. Suggest Ensure Enlive TID with meals, encouraging ONS rather than pop (more nutritive),    Malnutrition Assessment:  · Malnutrition Status: Meets the criteria for severe malnutrition  · Context: Chronic illness  · Findings of the 6 clinical characteristics of malnutrition (Minimum of 2 out of 6 clinical characteristics is required to make the diagnosis of moderate or severe Protein Calorie Malnutrition based on AND/ASPEN Guidelines):  1. Energy Intake-Not available, greater than or equal to 1 month    2. Weight Loss-20% loss or greater, in 1 year  3. Fat Loss-Severe subcutaneous fat loss, Orbital, Triceps, Fat overlying the ribs  4. Muscle Loss-Severe muscle mass loss, Temples (temporalis muscle), Clavicles (pectoralis and deltoids), Thigh (quadriceps), Calf (gastrocnemius), Interosseous, Scapula (trapezius)  5. Fluid Accumulation-No significant fluid accumulation, Extremities  6.  Strength-Not measured    Nutrition Diagnosis:   · Problem: Severe malnutrition  · Etiology: related to Catabolic illness     Signs and symptoms:  as evidenced by Patient report of, Intake 25-50%, Weight loss greater than or equal to 20% in 1 year, Severe loss of subcutaneous fat, Severe muscle loss, Diarrhea (13.7 BMI, 57%IBW)    Nutrition Assessment:  · Subjective Assessment: Pt family in room at time of visit. Pt has had a poor appetite and not eating well for some time; presenting with nausea/vomiting. Currently, nausea has resolved. Per family pt has gone from drinking beer to drinking pop. Pt feels very weak, and pop is used to get energy. Pt has had diarrhea for past few months and 6 weeks ago, pt wt was 150 lb. No wt history in EHR, however, pt wt loss is estimated to be 52# (23.6kg) 35% wt loss x 6 months. BMI is at 13.7 with cachetic appearance.    · Nutrition-Focused

## 2018-03-17 NOTE — PROCEDURES
Patient has known history of metastatic cancer. He has not had recent care. He presents with appearance of profound wasting. He is hypotensive. Lactic acid markedly elevated. He is unable to produce urine for us initially. He states he has had sustained an progressive nausea vomiting and progressive weakness. He is treated with 30 mL per kilo bolus as well as additional IV fluids and continues to be hypotensive. Blood cultures are obtained. Patient is empirically covered with cefepime pending ability to review formal urinalysis and reevaluation of symptoms, laboratory studies and chest x-ray. Central line is placed with regard to electrolyte supplementation, IV fluids, pressors and antibiotics. Patient and family are aware of the risks of bleeding and infection in agreement with procedure. Right IJ could not be utilized this patient has an indwelling port. Port has not been accessed in over 6 months making it  not usable at this time    Right femoral line placement:  Right femoral/inguinal region is prepped and draped in sterile fashion. Local anesthesia with 1% lidocaine plain 3 ML's infiltrated at the site. Standard triple lumen kit is utilized. With use of ultrasound right femoral vein is located and cannulated on first attempt. Guidewire placed. Dilator placed. Catheter inserted. All 3 ports flushed and corby without difficulty. Line is sutured into place. Patient tolerated procedure well. No complications. Minimal blood loss.

## 2018-03-18 PROBLEM — G93.41 METABOLIC ENCEPHALOPATHY: Status: ACTIVE | Noted: 2018-03-18

## 2018-03-18 LAB
ABSOLUTE EOS #: 0.2 K/UL (ref 0–0.4)
ABSOLUTE IMMATURE GRANULOCYTE: ABNORMAL K/UL (ref 0–0.3)
ABSOLUTE LYMPH #: 1.3 K/UL (ref 1–4.8)
ABSOLUTE MONO #: 0.8 K/UL (ref 0.2–0.8)
ALBUMIN SERPL-MCNC: 1.9 G/DL (ref 3.5–5.2)
ALBUMIN SERPL-MCNC: 2.3 G/DL (ref 3.5–5.2)
ALBUMIN/GLOBULIN RATIO: ABNORMAL (ref 1–2.5)
ALBUMIN/GLOBULIN RATIO: ABNORMAL (ref 1–2.5)
ALP BLD-CCNC: 237 U/L (ref 40–129)
ALP BLD-CCNC: 280 U/L (ref 40–129)
ALT SERPL-CCNC: 36 U/L (ref 5–41)
ALT SERPL-CCNC: 42 U/L (ref 5–41)
ANION GAP SERPL CALCULATED.3IONS-SCNC: 10 MMOL/L (ref 9–17)
ANION GAP SERPL CALCULATED.3IONS-SCNC: 11 MMOL/L (ref 9–17)
AST SERPL-CCNC: 56 U/L
AST SERPL-CCNC: 65 U/L
BASOPHILS # BLD: 0 % (ref 0–2)
BASOPHILS ABSOLUTE: 0 K/UL (ref 0–0.2)
BILIRUB SERPL-MCNC: 0.7 MG/DL (ref 0.3–1.2)
BILIRUB SERPL-MCNC: 0.85 MG/DL (ref 0.3–1.2)
BILIRUBIN DIRECT: 0.38 MG/DL
BILIRUBIN, INDIRECT: 0.47 MG/DL (ref 0–1)
BUN BLDV-MCNC: 3 MG/DL (ref 6–20)
BUN BLDV-MCNC: 4 MG/DL (ref 6–20)
BUN/CREAT BLD: ABNORMAL (ref 9–20)
CALCIUM IONIZED: 1.11 MMOL/L (ref 1.13–1.33)
CALCIUM SERPL-MCNC: 6.8 MG/DL (ref 8.6–10.4)
CALCIUM SERPL-MCNC: 6.9 MG/DL (ref 8.6–10.4)
CHLORIDE BLD-SCNC: 100 MMOL/L (ref 98–107)
CHLORIDE BLD-SCNC: 98 MMOL/L (ref 98–107)
CO2: 21 MMOL/L (ref 20–31)
CO2: 22 MMOL/L (ref 20–31)
CREAT SERPL-MCNC: <0.4 MG/DL (ref 0.7–1.2)
CREAT SERPL-MCNC: <0.4 MG/DL (ref 0.7–1.2)
DIFFERENTIAL TYPE: ABNORMAL
EOSINOPHILS RELATIVE PERCENT: 1 % (ref 1–4)
FOLATE: 8.3 NG/ML
GFR AFRICAN AMERICAN: ABNORMAL ML/MIN
GFR AFRICAN AMERICAN: ABNORMAL ML/MIN
GFR NON-AFRICAN AMERICAN: ABNORMAL ML/MIN
GFR NON-AFRICAN AMERICAN: ABNORMAL ML/MIN
GFR SERPL CREATININE-BSD FRML MDRD: ABNORMAL ML/MIN/{1.73_M2}
GLUCOSE BLD-MCNC: 154 MG/DL (ref 70–99)
GLUCOSE BLD-MCNC: 98 MG/DL (ref 70–99)
HCT VFR BLD CALC: 25.4 % (ref 41–53)
HEMOGLOBIN: 8.5 G/DL (ref 13.5–17.5)
IMMATURE GRANULOCYTES: ABNORMAL %
INR BLD: 1.2
LACTIC ACID, WHOLE BLOOD: NORMAL MMOL/L (ref 0.7–2.1)
LACTIC ACID: 1.5 MMOL/L (ref 0.5–2.2)
LYMPHOCYTES # BLD: 7 % (ref 24–44)
MCH RBC QN AUTO: 34.1 PG (ref 26–34)
MCHC RBC AUTO-ENTMCNC: 33.3 G/DL (ref 31–37)
MCV RBC AUTO: 102.3 FL (ref 80–100)
MONOCYTES # BLD: 5 % (ref 1–7)
NRBC AUTOMATED: ABNORMAL PER 100 WBC
PDW BLD-RTO: 15.7 % (ref 11.5–14.5)
PHOSPHORUS: 1.6 MG/DL (ref 2.5–4.5)
PLATELET # BLD: 262 K/UL (ref 130–400)
PLATELET ESTIMATE: ABNORMAL
PMV BLD AUTO: 6.1 FL (ref 6–12)
POTASSIUM SERPL-SCNC: 4.7 MMOL/L (ref 3.7–5.3)
POTASSIUM SERPL-SCNC: 4.9 MMOL/L (ref 3.7–5.3)
PROTHROMBIN TIME: 12.5 SEC (ref 9.7–11.6)
RBC # BLD: 2.48 M/UL (ref 4.5–5.9)
RBC # BLD: ABNORMAL 10*6/UL
SEG NEUTROPHILS: 87 % (ref 36–66)
SEGMENTED NEUTROPHILS ABSOLUTE COUNT: 15.6 K/UL (ref 1.8–7.7)
SODIUM BLD-SCNC: 127 MMOL/L (ref 135–144)
SODIUM BLD-SCNC: 130 MMOL/L (ref 135–144)
SODIUM BLD-SCNC: 130 MMOL/L (ref 135–144)
SODIUM BLD-SCNC: 132 MMOL/L (ref 135–144)
TOTAL PROTEIN: 4.1 G/DL (ref 6.4–8.3)
TOTAL PROTEIN: 4.5 G/DL (ref 6.4–8.3)
VITAMIN B-12: 937 PG/ML (ref 232–1245)
WBC # BLD: 17.9 K/UL (ref 3.5–11)
WBC # BLD: ABNORMAL 10*3/UL

## 2018-03-18 PROCEDURE — 84100 ASSAY OF PHOSPHORUS: CPT

## 2018-03-18 PROCEDURE — 2500000003 HC RX 250 WO HCPCS: Performed by: INTERNAL MEDICINE

## 2018-03-18 PROCEDURE — 6360000002 HC RX W HCPCS: Performed by: NURSE PRACTITIONER

## 2018-03-18 PROCEDURE — 2500000003 HC RX 250 WO HCPCS: Performed by: NURSE PRACTITIONER

## 2018-03-18 PROCEDURE — 82607 VITAMIN B-12: CPT

## 2018-03-18 PROCEDURE — 80053 COMPREHEN METABOLIC PANEL: CPT

## 2018-03-18 PROCEDURE — 6370000000 HC RX 637 (ALT 250 FOR IP): Performed by: NURSE PRACTITIONER

## 2018-03-18 PROCEDURE — 2060000000 HC ICU INTERMEDIATE R&B

## 2018-03-18 PROCEDURE — 2580000003 HC RX 258: Performed by: INTERNAL MEDICINE

## 2018-03-18 PROCEDURE — 6360000002 HC RX W HCPCS: Performed by: INTERNAL MEDICINE

## 2018-03-18 PROCEDURE — 82248 BILIRUBIN DIRECT: CPT

## 2018-03-18 PROCEDURE — 6360000002 HC RX W HCPCS: Performed by: EMERGENCY MEDICINE

## 2018-03-18 PROCEDURE — 84295 ASSAY OF SERUM SODIUM: CPT

## 2018-03-18 PROCEDURE — 85610 PROTHROMBIN TIME: CPT

## 2018-03-18 PROCEDURE — 82330 ASSAY OF CALCIUM: CPT

## 2018-03-18 PROCEDURE — 85025 COMPLETE CBC W/AUTO DIFF WBC: CPT

## 2018-03-18 PROCEDURE — 83605 ASSAY OF LACTIC ACID: CPT

## 2018-03-18 PROCEDURE — 6370000000 HC RX 637 (ALT 250 FOR IP): Performed by: INTERNAL MEDICINE

## 2018-03-18 PROCEDURE — 2580000003 HC RX 258: Performed by: NURSE PRACTITIONER

## 2018-03-18 PROCEDURE — 36415 COLL VENOUS BLD VENIPUNCTURE: CPT

## 2018-03-18 PROCEDURE — 99232 SBSQ HOSP IP/OBS MODERATE 35: CPT | Performed by: INTERNAL MEDICINE

## 2018-03-18 PROCEDURE — 82746 ASSAY OF FOLIC ACID SERUM: CPT

## 2018-03-18 RX ADMIN — Medication 400 MG: at 09:12

## 2018-03-18 RX ADMIN — PANTOPRAZOLE SODIUM 40 MG: 40 TABLET, DELAYED RELEASE ORAL at 06:14

## 2018-03-18 RX ADMIN — Medication 10 ML: at 09:10

## 2018-03-18 RX ADMIN — MIRTAZAPINE 15 MG: 15 TABLET, ORALLY DISINTEGRATING ORAL at 20:19

## 2018-03-18 RX ADMIN — CALCIUM GLUCONATE 1 G: 98 INJECTION, SOLUTION INTRAVENOUS at 15:17

## 2018-03-18 RX ADMIN — DEXAMETHASONE 2 MG: 4 TABLET ORAL at 17:41

## 2018-03-18 RX ADMIN — Medication 10 ML: at 20:28

## 2018-03-18 RX ADMIN — FOLIC ACID: 5 INJECTION, SOLUTION INTRAMUSCULAR; INTRAVENOUS; SUBCUTANEOUS at 08:48

## 2018-03-18 RX ADMIN — MORPHINE SULFATE 45 MG: 15 TABLET, FILM COATED, EXTENDED RELEASE ORAL at 09:12

## 2018-03-18 RX ADMIN — DEXAMETHASONE 2 MG: 4 TABLET ORAL at 09:12

## 2018-03-18 RX ADMIN — POTASSIUM CHLORIDE AND SODIUM CHLORIDE: 900; 300 INJECTION, SOLUTION INTRAVENOUS at 17:42

## 2018-03-18 RX ADMIN — VENLAFAXINE HYDROCHLORIDE 75 MG: 75 CAPSULE, EXTENDED RELEASE ORAL at 09:12

## 2018-03-18 RX ADMIN — GABAPENTIN 800 MG: 400 CAPSULE ORAL at 20:19

## 2018-03-18 RX ADMIN — CEFEPIME HYDROCHLORIDE 1 G: 1 INJECTION, POWDER, FOR SOLUTION INTRAMUSCULAR; INTRAVENOUS at 12:46

## 2018-03-18 RX ADMIN — GABAPENTIN 800 MG: 400 CAPSULE ORAL at 09:12

## 2018-03-18 RX ADMIN — ENOXAPARIN SODIUM 40 MG: 40 INJECTION SUBCUTANEOUS at 09:12

## 2018-03-18 RX ADMIN — MORPHINE SULFATE 45 MG: 15 TABLET, FILM COATED, EXTENDED RELEASE ORAL at 20:19

## 2018-03-18 RX ADMIN — SODIUM PHOSPHATE, MONOBASIC, MONOHYDRATE 7.14 MMOL: 276; 142 INJECTION, SOLUTION INTRAVENOUS at 08:48

## 2018-03-18 RX ADMIN — CEFEPIME HYDROCHLORIDE 1 G: 1 INJECTION, POWDER, FOR SOLUTION INTRAMUSCULAR; INTRAVENOUS at 00:58

## 2018-03-18 ASSESSMENT — ENCOUNTER SYMPTOMS
NAUSEA: 0
WHEEZING: 1
BACK PAIN: 1
SPUTUM PRODUCTION: 1
VOMITING: 0
SHORTNESS OF BREATH: 1
COUGH: 1
BLOOD IN STOOL: 0

## 2018-03-18 ASSESSMENT — PAIN DESCRIPTION - LOCATION: LOCATION: GENERALIZED

## 2018-03-18 ASSESSMENT — PAIN DESCRIPTION - PAIN TYPE: TYPE: CHRONIC PAIN

## 2018-03-18 ASSESSMENT — PAIN SCALES - GENERAL
PAINLEVEL_OUTOF10: 10
PAINLEVEL_OUTOF10: 0
PAINLEVEL_OUTOF10: 10

## 2018-03-18 NOTE — PROGRESS NOTES
[facility]    Progress Note    3/18/2018    11:15 AM    Name:   Omid Jaquez  MRN:     [id number]     Acct:      [de-identified]   Room:   [address] [place] [address] Day:  1  Admit Date:  3/16/2018  9:39 PM    PCP:   Juno Noland PA-C, PA-C  Code Status:  DNR-CCA    Subjective:     C/C:   Chief Complaint   Patient presents with    Other     generalized body aches / lung CA by hx     Interval History Status:  improved    More alert  Stronger  Appetite better  Was able to ambulate to bathroom  Phosphorous still low  The patient reports that he had received home hospice care prior to admission? Does not recall what hospice organization was providing his home care    Brief History:     The patient is a 62 y.o. male who presents with: Other (generalized body aches / lung CA by hx)        Patient was admitted thru ER with:  \"Kris Pulliam is a 62 y. o. male who presents to the emergency department via private auto for generalized body aches for 2-3 days. Family reports he has had dizziness and frequent falls the past couple of weeks. Reports N/V, increasing weakness. States he has not eaten for 2-3 days. 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 His last treatment was around six months ago. Denies injury with the falls.  Rates his pain 10/10 at this time. \"     Has a known history of lung cancer  Has been receiving palliative care / hospice care at home  He is very weak and debilitated  Unable to provide much meaningful historical data  Found to be not very conversant  Initial evaluation has revealed hypotension, hypomagnesemia, elevated lactic acid, abnormal liver function testing, hypophosphatemia, macrocytic anemia    Cultures have been negative  The patient was covered for alcohol withdrawal symptoms  Nutritional support was ordered  Physical therapy requested  Social service consulted  His electrolyte abnormalities were addressed    Past Medical History:   has a past medical history of Cancer ([facility] Secondary malignant neoplasm of bone ([facility] Severe malnutrition ([facility] and Stage 4 malignant neoplasm of lung ([facility] Social History:   reports that he has been smoking Cigarettes. He does not have any smokeless tobacco history on file. He reports that he drinks about 1.8 oz of alcohol per week . He reports that he uses drugs, including Marijuana. Family History: History reviewed. No pertinent family history. Medications: Allergies:  No Known Allergies    Current Meds:   Scheduled Meds:    dexamethasone  2 mg Oral BID WC    gabapentin  800 mg Oral TID    pantoprazole  40 mg Oral QAM AC    sodium chloride flush  10 mL Intravenous 2 times per day    enoxaparin  40 mg Subcutaneous Daily    folic acid, thiamine, multi-vitamin with vitamin K infusion   Intravenous Daily    nicotine  1 patch Transdermal Daily    magnesium oxide  400 mg Oral Daily    cefepime (MAXIPIME) 1 g IVPB minibag in NS  1 g Intravenous 2 times per day    mirtazapine  15 mg Oral Nightly    venlafaxine  75 mg Oral Daily    morphine  45 mg Oral 2 times per day    potassium bicarbonate  25 mEq Oral Once     Continuous Infusions:    0.9% NaCl with KCl 40 mEq 100 mL/hr at 03/17/18 2240     PRN Meds: sodium chloride flush, acetaminophen, LORazepam **OR** LORazepam **OR** LORazepam **OR** LORazepam **OR** LORazepam **OR** LORazepam **OR** LORazepam **OR** LORazepam, potassium chloride, sodium phosphate IVPB **OR** sodium phosphate IVPB, magnesium sulfate, ondansetron, oxyCODONE-acetaminophen      Review of Systems:     Review of Systems   Constitutional: Negative for chills and fever. Anorexia  Weight loss not quantitated   Respiratory: Positive for cough, sputum production, shortness of breath and wheezing. Cardiovascular: Negative for chest pain and palpitations. Gastrointestinal: Negative for blood in stool, melena, nausea and vomiting.    Genitourinary:

## 2018-03-18 NOTE — PLAN OF CARE
Problem: Falls - Risk of  Goal: Absence of falls  Outcome: Ongoing  Pt fall risk, fall band present, falling star, safety alarm activated and in use as needed. Hourly rounding performed. Pt encouraged to use call light. See Rachael Mckeon fall risk assessment.

## 2018-03-18 NOTE — CARE COORDINATION
Case Management Initial Discharge Plan  Manasa Reed,         Readmission Risk              Readmission Risk:        29       Age 72 or Greater:  0    Admitted from SNF or Requires Paid or Family Care:  0    Currently has CHF,COPD,ARF,CRI,or is on dialysis:  4    Takes more than 5 Prescription Medications:  4    Takes Digoxin,Insulin,Anticoagulants,Narcotics or ASA/Plavix:  [address] in Past 12 Months:  10    On Disability:  3    Patient Considers own Health:  5            Met with:patient to discuss discharge plans. Information verified: address, contacts, phone number, , insurance Yes  PCP: Nasim Vaca PA-C  Date of last visit: months ago    Insurance Provider: Dafne Glaser    Discharge Planning  Current Residence:  house  Living Arrangements:  Spouse/Significant Other   Home has 1 stories/2 stairs to climb  Support Systems:  Parent, Children, Spouse/Significant Other  Current Services PTA:   Palliative Care Agency: [id number]  Patient able to perform ADL's:Assisted  DME in home:   none  DME used to aid ambulation prior to admission:   Marcelina Gaffney - used his father's  DME used during admission:   [facility]    Potential Assistance Needed:  N/A, Other (Comment) (palliative nurse stops by)    Pharmacy: [facility] [place] Medications:  No  Does patient want to participate in local refill/ meds to beds program?  N/A    Patient agreeable to home care: Yes  Freedom of choice provided:  yes      Type of Home Care Services:  None  Patient expects to be discharged to:  Home    Prior SNF/Rehab Placement and Facility: none  Agreeable to SNF/Rehab: No  Independence of choice provided: n/a   Evaluation: no    Expected Discharge date:  18  Follow Up Appointment: Best Day/ Time:      Transportation provider: mother  Transportation arrangements needed for discharge: Yes    Discharge Plan: Met with pt at bedside.   Pt lives with his mother and

## 2018-03-18 NOTE — PLAN OF CARE
(PERCOCET) 5-325 MG per tablet 1 tablet Q4H PRN   venlafaxine (EFFEXOR XR) extended release capsule 75 mg Daily   morphine (MS CONTIN) extended release tablet 45 mg 2 times per day   potassium bicarbonate (K-LYTE) disintegrating tablet 25 mEq Once   0.9% NaCl with KCl 40 mEq infusion Continuous       VITALS:  /82   Pulse 79   Temp 97.9 °F (36.6 °C) (Oral)   Resp 20   Ht 5' 11\" (1.803 m)   Wt 98 lb 5 oz (44.6 kg)   SpO2 97%   BMI 13.71 kg/m²     LABS:   Hematology:  Recent Labs      03/16/18 2224 03/17/18   0546  03/18/18   0601   WBC  13.8*  19.5*  17.9*   HGB  9.0*  8.2*  8.5*   HCT  27.3*  24.0*  25.4*   PLT  342  324  262   INR   --   1.3  1.2     Chemistry:  Recent Labs      03/16/18 2224 03/17/18   1210  03/17/18   1954  03/17/18   2351  03/18/18   0601  03/18/18   1148   NA  129*   < >  135   --   130*  132*  130*   K  2.4*   < >   --   3.8   --   4.7  4.9   CL  88*   --    --    --    --   100  98   CO2  25   --    --    --    --   22  21   GLUCOSE  74   --    --    --    --   98  154*   BUN  5*   --    --    --    --   3*  4*   CREATININE  0.45*   --    --    --    --   <0.40*  <0.40*   MG  1.3*   --   1.6   --    --    --    --    CALCIUM  7.1*   --    --    --    --   6.9*  6.8*   CAION   --    --    --    --    --   1.11*   --    PHOS  1.8*   --    --    --    --   1.6*   --     < > = values in this interval not displayed.      Recent Labs      03/16/18 2224 03/16/18   2235  03/17/18   0546  03/18/18   0601  03/18/18   1148   PROT  5.0*   --    --   4.1*  4.5*   LABALBU  2.5*   --   2.2*  1.9*  2.3*   TSH   --    --   0.65   --    --    AST  98*   --    --   56*  65*   ALT  57*   --    --   36  42*   ALKPHOS  299*   --    --   237*  280*   BILITOT  0.41   --    --   0.70  0.85   BILIDIR  0.19   --    --    --   0.38*   AMMONIA   --   39   --    --    --    CKTOTAL  58   --    --    --    --    CKMB  1.6   --    --    --    --        PROBLEMS:  Principal Problem:    Primary malignant neoplasm of left upper lobe of lung (HCC)  Active Problems:    Sepsis ([address]    Hypotension    Hypomagnesemia    Elevated lactic acid level    Transaminasemia    Macrocytic anemia    Tobacco use    Alcohol use disorder (HCC)    Severe malnutrition (HCC)    Cachexia (HCC)    Hypokalemia    Hyponatremia    Hypophosphatemia    Hypoalbuminemia due to protein-calorie malnutrition (HCC)    Secondary malignant neoplasm of bone (HCC)    Bone pain    Stage 4 malignant neoplasm of lung (HCC)    Lactic acid increased    Wasting syndrome (HCC)    Metabolic encephalopathy  Resolved Problems:    * No resolved hospital problems. *      UPDATED PLAN:  See current orders  Hydration  Antibiotics per C&S   Correct electrolyte abnormalities  Nutritional supplementation, diet as tolerated  Transfuse as needed, check TSH, folate, B12  Awaiting family to assist with history and code status, possible history of AIDS? Condition and prognosis guarded  Data base in progress  Palliative care, hospice evaluation?   Pain management  DVT prophylaxis  GI prophylaxis   Monitor for DTs  Thiamine  Physical therapy and rehabilitation   Social Service consult for discharge planning    IP CONSULT TO INTERNAL MEDICINE  IP CONSULT TO INTERVENTIONAL RADIOLOGY  IP CONSULT TO PALLIATIVE CARE  IP CONSULT TO SOCIAL WORK    Martínez Jacobs DO  3/18/2018  2:39 PM

## 2018-03-19 VITALS
OXYGEN SATURATION: 97 % | DIASTOLIC BLOOD PRESSURE: 73 MMHG | SYSTOLIC BLOOD PRESSURE: 89 MMHG | WEIGHT: 98.31 LBS | RESPIRATION RATE: 18 BRPM | HEIGHT: 71 IN | BODY MASS INDEX: 13.76 KG/M2 | HEART RATE: 92 BPM | TEMPERATURE: 97.7 F

## 2018-03-19 PROBLEM — B20 SEPSIS ASSOCIATED WITH ACQUIRED IMMUNE DEFICIENCY SYNDROME (AIDS) (HCC): Status: ACTIVE | Noted: 2018-03-17

## 2018-03-19 LAB
ABSOLUTE EOS #: 0 K/UL (ref 0–0.4)
ABSOLUTE IMMATURE GRANULOCYTE: ABNORMAL K/UL (ref 0–0.3)
ABSOLUTE LYMPH #: 2.9 K/UL (ref 1–4.8)
ABSOLUTE MONO #: 0.45 K/UL (ref 0.2–0.8)
ALBUMIN SERPL-MCNC: 2.2 G/DL (ref 3.5–5.2)
BASOPHILS # BLD: 0 %
BASOPHILS ABSOLUTE: 0 K/UL (ref 0–0.2)
CALCIUM IONIZED: 1.13 MMOL/L (ref 1.13–1.33)
CORTISOL COLLECTION INFO: ABNORMAL
CORTISOL: 0.5 UG/DL (ref 2.7–18.4)
DIFFERENTIAL TYPE: ABNORMAL
EOSINOPHILS RELATIVE PERCENT: 0 % (ref 1–4)
HCT VFR BLD CALC: 27.2 % (ref 41–53)
HEMOGLOBIN: 9 G/DL (ref 13.5–17.5)
IMMATURE GRANULOCYTES: ABNORMAL %
LYMPHOCYTES # BLD: 13 % (ref 24–44)
MCH RBC QN AUTO: 34.2 PG (ref 26–34)
MCHC RBC AUTO-ENTMCNC: 33.3 G/DL (ref 31–37)
MCV RBC AUTO: 102.9 FL (ref 80–100)
METAMYELOCYTES ABSOLUTE COUNT: 0.45 K/UL
METAMYELOCYTES: 2 %
MONOCYTES # BLD: 2 % (ref 1–7)
NRBC AUTOMATED: ABNORMAL PER 100 WBC
PDW BLD-RTO: 15.6 % (ref 11.5–14.5)
PHOSPHORUS: 2.6 MG/DL (ref 2.5–4.5)
PLATELET # BLD: 312 K/UL (ref 130–400)
PLATELET ESTIMATE: ABNORMAL
PMV BLD AUTO: 6.4 FL (ref 6–12)
RBC # BLD: 2.64 M/UL (ref 4.5–5.9)
RBC # BLD: ABNORMAL 10*6/UL
SEG NEUTROPHILS: 83 % (ref 36–66)
SEGMENTED NEUTROPHILS ABSOLUTE COUNT: 18.5 K/UL (ref 1.8–7.7)
SODIUM BLD-SCNC: 127 MMOL/L (ref 135–144)
SODIUM BLD-SCNC: 128 MMOL/L (ref 135–144)
SODIUM BLD-SCNC: 130 MMOL/L (ref 135–144)
WBC # BLD: 22.3 K/UL (ref 3.5–11)
WBC # BLD: ABNORMAL 10*3/UL

## 2018-03-19 PROCEDURE — 6360000002 HC RX W HCPCS: Performed by: INTERNAL MEDICINE

## 2018-03-19 PROCEDURE — 85025 COMPLETE CBC W/AUTO DIFF WBC: CPT

## 2018-03-19 PROCEDURE — 36415 COLL VENOUS BLD VENIPUNCTURE: CPT

## 2018-03-19 PROCEDURE — 6370000000 HC RX 637 (ALT 250 FOR IP): Performed by: NURSE PRACTITIONER

## 2018-03-19 PROCEDURE — 2580000003 HC RX 258: Performed by: NURSE PRACTITIONER

## 2018-03-19 PROCEDURE — 6360000002 HC RX W HCPCS: Performed by: NURSE PRACTITIONER

## 2018-03-19 PROCEDURE — 82040 ASSAY OF SERUM ALBUMIN: CPT

## 2018-03-19 PROCEDURE — 6370000000 HC RX 637 (ALT 250 FOR IP): Performed by: INTERNAL MEDICINE

## 2018-03-19 PROCEDURE — 99239 HOSP IP/OBS DSCHRG MGMT >30: CPT | Performed by: INTERNAL MEDICINE

## 2018-03-19 PROCEDURE — 84295 ASSAY OF SERUM SODIUM: CPT

## 2018-03-19 PROCEDURE — 82533 TOTAL CORTISOL: CPT

## 2018-03-19 PROCEDURE — 84100 ASSAY OF PHOSPHORUS: CPT

## 2018-03-19 PROCEDURE — 2500000003 HC RX 250 WO HCPCS: Performed by: NURSE PRACTITIONER

## 2018-03-19 PROCEDURE — 2580000003 HC RX 258: Performed by: INTERNAL MEDICINE

## 2018-03-19 PROCEDURE — 82330 ASSAY OF CALCIUM: CPT

## 2018-03-19 RX ORDER — MIDODRINE HYDROCHLORIDE 10 MG/1
10 TABLET ORAL 2 TIMES DAILY
Status: DISCONTINUED | OUTPATIENT
Start: 2018-03-19 | End: 2018-03-19

## 2018-03-19 RX ORDER — ALBUTEROL SULFATE 2.5 MG/3ML
2.5 SOLUTION RESPIRATORY (INHALATION) EVERY 6 HOURS PRN
Status: DISCONTINUED | OUTPATIENT
Start: 2018-03-19 | End: 2018-03-19 | Stop reason: HOSPADM

## 2018-03-19 RX ORDER — MEGESTROL ACETATE 40 MG/ML
400 SUSPENSION ORAL 2 TIMES DAILY
Qty: 240 ML | Refills: 3 | Status: SHIPPED | OUTPATIENT
Start: 2018-03-19

## 2018-03-19 RX ORDER — SODIUM CHLORIDE 9 MG/ML
INJECTION, SOLUTION INTRAVENOUS CONTINUOUS
Status: DISCONTINUED | OUTPATIENT
Start: 2018-03-19 | End: 2018-03-19

## 2018-03-19 RX ORDER — OXYCODONE HYDROCHLORIDE AND ACETAMINOPHEN 5; 325 MG/1; MG/1
1 TABLET ORAL EVERY 4 HOURS PRN
Qty: 15 TABLET | Refills: 0 | Status: SHIPPED | OUTPATIENT
Start: 2018-03-19 | End: 2018-03-22

## 2018-03-19 RX ORDER — MORPHINE SULFATE 30 MG/1
45 TABLET, FILM COATED, EXTENDED RELEASE ORAL 2 TIMES DAILY
Qty: 14 TABLET | Refills: 0 | Status: SHIPPED | OUTPATIENT
Start: 2018-03-19 | End: 2018-03-22

## 2018-03-19 RX ORDER — MIRTAZAPINE 15 MG/1
15 TABLET, ORALLY DISINTEGRATING ORAL NIGHTLY
Qty: 14 TABLET | Refills: 0 | Status: SHIPPED | OUTPATIENT
Start: 2018-03-19 | End: 2018-04-02

## 2018-03-19 RX ORDER — IPRATROPIUM BROMIDE AND ALBUTEROL SULFATE 2.5; .5 MG/3ML; MG/3ML
1 SOLUTION RESPIRATORY (INHALATION)
Status: DISCONTINUED | OUTPATIENT
Start: 2018-03-19 | End: 2018-03-19 | Stop reason: HOSPADM

## 2018-03-19 RX ORDER — CEFUROXIME AXETIL 250 MG/1
250 TABLET ORAL 2 TIMES DAILY
Qty: 14 TABLET | Refills: 0 | Status: SHIPPED | OUTPATIENT
Start: 2018-03-19 | End: 2018-03-26

## 2018-03-19 RX ORDER — METHYLPREDNISOLONE SODIUM SUCCINATE 125 MG/2ML
125 INJECTION, POWDER, LYOPHILIZED, FOR SOLUTION INTRAMUSCULAR; INTRAVENOUS ONCE
Status: COMPLETED | OUTPATIENT
Start: 2018-03-19 | End: 2018-03-19

## 2018-03-19 RX ORDER — DEXAMETHASONE 2 MG/1
4 TABLET ORAL 4 TIMES DAILY
Qty: 28 TABLET | Refills: 0 | Status: SHIPPED | OUTPATIENT
Start: 2018-03-19

## 2018-03-19 RX ORDER — 0.9 % SODIUM CHLORIDE 0.9 %
500 INTRAVENOUS SOLUTION INTRAVENOUS ONCE
Status: DISCONTINUED | OUTPATIENT
Start: 2018-03-19 | End: 2018-03-19 | Stop reason: HOSPADM

## 2018-03-19 RX ORDER — MEGESTROL ACETATE 40 MG/ML
400 SUSPENSION ORAL 2 TIMES DAILY
Status: DISCONTINUED | OUTPATIENT
Start: 2018-03-19 | End: 2018-03-19 | Stop reason: HOSPADM

## 2018-03-19 RX ORDER — MIDODRINE HYDROCHLORIDE 10 MG/1
10 TABLET ORAL
Status: DISCONTINUED | OUTPATIENT
Start: 2018-03-19 | End: 2018-03-19 | Stop reason: HOSPADM

## 2018-03-19 RX ORDER — MIDODRINE HYDROCHLORIDE 10 MG/1
10 TABLET ORAL
Qty: 15 TABLET | Refills: 0 | Status: SHIPPED | OUTPATIENT
Start: 2018-03-19 | End: 2018-03-24

## 2018-03-19 RX ADMIN — PANTOPRAZOLE SODIUM 40 MG: 40 TABLET, DELAYED RELEASE ORAL at 06:09

## 2018-03-19 RX ADMIN — CEFEPIME HYDROCHLORIDE 1 G: 1 INJECTION, POWDER, FOR SOLUTION INTRAMUSCULAR; INTRAVENOUS at 01:57

## 2018-03-19 RX ADMIN — Medication 400 MG: at 08:36

## 2018-03-19 RX ADMIN — CEFEPIME HYDROCHLORIDE 1 G: 1 INJECTION, POWDER, FOR SOLUTION INTRAMUSCULAR; INTRAVENOUS at 13:39

## 2018-03-19 RX ADMIN — GABAPENTIN 800 MG: 400 CAPSULE ORAL at 08:36

## 2018-03-19 RX ADMIN — MEGESTROL ACETATE 400 MG: 40 SUSPENSION ORAL at 11:09

## 2018-03-19 RX ADMIN — GABAPENTIN 800 MG: 400 CAPSULE ORAL at 13:46

## 2018-03-19 RX ADMIN — FOLIC ACID: 5 INJECTION, SOLUTION INTRAMUSCULAR; INTRAVENOUS; SUBCUTANEOUS at 08:32

## 2018-03-19 RX ADMIN — METHYLPREDNISOLONE SODIUM SUCCINATE 125 MG: 125 INJECTION, POWDER, FOR SOLUTION INTRAMUSCULAR; INTRAVENOUS at 15:01

## 2018-03-19 RX ADMIN — Medication 10 ML: at 11:10

## 2018-03-19 RX ADMIN — OXYCODONE HYDROCHLORIDE AND ACETAMINOPHEN 1 TABLET: 5; 325 TABLET ORAL at 11:09

## 2018-03-19 RX ADMIN — MORPHINE SULFATE 45 MG: 15 TABLET, FILM COATED, EXTENDED RELEASE ORAL at 10:03

## 2018-03-19 RX ADMIN — DEXAMETHASONE 2 MG: 4 TABLET ORAL at 08:36

## 2018-03-19 RX ADMIN — MIDODRINE HYDROCHLORIDE 10 MG: 10 TABLET ORAL at 08:42

## 2018-03-19 RX ADMIN — VENLAFAXINE HYDROCHLORIDE 75 MG: 75 CAPSULE, EXTENDED RELEASE ORAL at 08:35

## 2018-03-19 RX ADMIN — ENOXAPARIN SODIUM 40 MG: 40 INJECTION SUBCUTANEOUS at 08:36

## 2018-03-19 ASSESSMENT — PAIN SCALES - GENERAL
PAINLEVEL_OUTOF10: 10
PAINLEVEL_OUTOF10: 7
PAINLEVEL_OUTOF10: 10
PAINLEVEL_OUTOF10: 0

## 2018-03-19 NOTE — DISCHARGE SUMMARY
level    Transaminasemia    Macrocytic anemia    Tobacco use    Alcohol use disorder (HCC)    Severe malnutrition (HCC)    Cachexia ([address]    Hypokalemia    Hyponatremia    Hypophosphatemia    Hypoalbuminemia due to protein-calorie malnutrition ([address]    Secondary malignant neoplasm of bone ([address]    Bone pain    Stage 4 malignant neoplasm of lung ([address]    Lactic acid increased    Wasting syndrome ([address]  Plan:         1. Okay to add Percocet every 6 hours when necessary for breakthrough pain  2. Continue Megace for decreased appetite  3. Added ProAmatine 5 mg 3 times a day for chronic persistent hypotension  4. Decadron dose was increased to4 times a day due to low cortisol levels  5.  Discharge home with hospice    Significant therapeutic interventions: See above notes    Significant Diagnostic Studies:   Labs / Micro:  CBC:   Lab Results   Component Value Date    WBC 22.3 03/19/2018    RBC 2.64 03/19/2018    HGB 9.0 03/19/2018    HCT 27.2 03/19/2018    .9 03/19/2018    MCH 34.2 03/19/2018    MCHC 33.3 03/19/2018    RDW 15.6 03/19/2018     03/19/2018     BMP:    Lab Results   Component Value Date    GLUCOSE 154 03/18/2018     03/19/2018    K 4.9 03/18/2018    CL 98 03/18/2018    CO2 21 03/18/2018    ANIONGAP 11 03/18/2018    BUN 4 03/18/2018    CREATININE <0.40 03/18/2018    BUNCRER CANNOT BE CALCULATED 03/18/2018    CALCIUM 6.8 03/18/2018    LABGLOM CANNOT BE CALCULATED 03/18/2018    GFRAA CANNOT BE CALCULATED 03/18/2018    GFR      03/18/2018    GFR NOT REPORTED 03/18/2018     HFP:    Lab Results   Component Value Date    PROT 4.5 03/18/2018     CMP:    Lab Results   Component Value Date    GLUCOSE 154 03/18/2018     03/19/2018    K 4.9 03/18/2018    CL 98 03/18/2018    CO2 21 03/18/2018    BUN 4 03/18/2018    CREATININE <0.40 03/18/2018    ANIONGAP 11 03/18/2018    ALKPHOS 280 03/18/2018    ALT 42 03/18/2018    AST 65 03/18/2018    BILITOT 0.85 03/18/2018    LABALBU 2.2 03/19/2018

## 2018-03-19 NOTE — CARE COORDINATION
Discharge planning    Seen by [address] hospice and patient will meet with patient in the home to open case. Asking about PORT and if can be removed prior to discharge as it is non functioning. Updated Clinical lead Sterling Magallon and will notify RN. Will await Dr Shelton Herman second round to discuss. Potential for dc home later today.

## 2018-03-19 NOTE — PROGRESS NOTES
cooperative and no distress,Cachexic  Mental Status:  oriented to person, place and time and normal affect  Lungs:  Expiratory phase prolonged, diminished breath sounds at bases  Heart:  regular rate and rhythm, no murmur  Abdomen:  soft, nontender, nondistended, normal bowel sounds, no masses, hepatomegaly, splenomegaly  Extremities:  no edema, redness, tenderness in the calves  Skin:  no gross lesions, rashes, induration    Assessment:        Primary Problem  Principal Problem:    Primary malignant neoplasm of left upper lobe of lung (HCC)  Active Problems:  Anterior metastasis    Sepsis (HCC)-appears to be less likely, the hypotension probably is due to generalized debility and possible arterial insufficiency    Hypotension    Hypomagnesemia. Elevated lactic acid level    Transaminasemia    Macrocytic anemia    Tobacco use    Alcohol use disorder (HCC)    Severe malnutrition (HCC)    Cachexia (HCC)    Hypokalemia    Hyponatremia    Hypophosphatemia    Hypoalbuminemia due to protein-calorie malnutrition (HCC)    Secondary malignant neoplasm of bone (HCC)    Bone pain    Stage 4 malignant neoplasm of lung (HCC)    Lactic acid increased    Wasting syndrome ([address]  Resolved Problems:    * No resolved hospital problems. *       Plan:        1. Okay to add Percocet every 6 hours when necessary for breakthrough pain  2. Add Megace for decreased appetite  3. Add ProAmatine 5 mg 3 times a day for chronic persistent hypotension  4. Check cortisol levels  5.  Hospice will be seeing him today    Time Spent 40 minutes on reviewing the records, talking to the patient and examining for decision-making    Yolanda Bradford MD  3/19/2018  8:10 AM

## 2018-03-19 NOTE — PLAN OF CARE
[facility]    Second Visit Note  For more detailed information please refer to the progress note of the day      3/19/2018    2:40 PM    Name:   Wale Lizama  MRN:     [id number]     Acct:      [de-identified]   Room:   [address] [place] [address] Day:  2  Admit Date:  3/16/2018  9:39 PM    PCP:   Ludin Al PA-C, PA-C  Code Status:  DNR-CCA        Pt vitals were reviewed   New labs were reviewed   Patient was seen    Updated plan :     1. Cortisol level reportedly seems just now is 0.5 which is very low, current hypotension is probably greater than insufficiency  2. Will give IV Solu-Medrol 125 mg ×1 and increase the dose of Decadron  3.  Patient is going home with hospice today        Carlos Cowan MD  3/19/2018  2:40 PM

## 2018-03-19 NOTE — DISCHARGE INSTR - DIET

## 2018-03-19 NOTE — PROGRESS NOTES
Pt. BP reported to be 83/63. Pt. Alert, responsive. Skin warm, dry. C/o dizziness with movement but states has had dizziness. Pt. Urine janice. 500 ml emptied. IV fluids infusing at 100ml/hr. Pt. Encouraged to push po fluids. Pt. Moves self to side of bed to urinate in urinal. 9125 BP rechecked shows improvement as charted.

## 2018-03-19 NOTE — PLAN OF CARE
Problem: Falls - Risk of  Goal: Absence of falls  Outcome: Ongoing  Pt. Remains free of falls.  Up to BR with stand by assist.

## 2018-03-19 NOTE — PROGRESS NOTES
Patient blood pressure after bolus 79/57. Dr. Bon Aguillon notified. He states he will put in new orders.

## 2018-03-23 LAB
CULTURE: NORMAL
Lab: NORMAL
Lab: NORMAL
SPECIMEN DESCRIPTION: NORMAL
STATUS: NORMAL
STATUS: NORMAL

## 2024-11-05 NOTE — H&P
[address] [place] [address]    HISTORY AND PHYSICAL EXAMINATION            Date:   3/17/2018  Patient name:  Indigo Flanagan  Date of admission:  3/16/2018  9:39 PM  MRN:   [id number]  Account:  [de-identified]  YOB: 1960  PCP:    Jena Morales PA-C  Room:   [address] [place] [address]  Code Status:    Full Code    Chief Complaint:     Chief Complaint   Patient presents with    Other     generalized body aches / lung CA by hx       History Obtained From:     patient, electronic medical record    History of Present Illness: The patient is a 62 y.o. male who presents with: Other (generalized body aches / lung CA by hx)       Patient was admitted thru ER with:  Jailene Orozco is a 62 y.o. male who presents to the emergency department via private auto for generalized body aches for 2-3 days. Family reports he has had dizziness and frequent falls the past couple of weeks. Reports N/V, increasing weakness. States he has not eaten for 2-3 days. 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 His last treatment was around six months ago. Denies injury with the falls. Rates his pain 10/10 at this time.  \"    Has a known history of lung cancer  Has been receiving palliative care  He is very weak and debilitated  Unable to provide much meaningful historical data  Not very conversant, mumbling  Initial evaluation has revealed hypotension, hypomagnesemia, elevated lactic acid, abnormal liver function testing, hypophosphatemia, macrocytic anemia    Past Medical History:     Past Medical History:   Diagnosis Date    Cancer ([address]     lung/ has had chemo/radiation / malignancy also in adrenal gland / vertebrae        Past Surgical History:     Past Surgical History:   Procedure Laterality Date    HERNIA REPAIR      TUNNELED VENOUS PORT PLACEMENT          Medications Prior to Admission:     Prior to Admission medications    Medication Sig Start Date End Date Taking? Authorizing Provider   gabapentin (NEURONTIN) 800 MG tablet Take 800 mg by mouth 3 times daily. Yes Historical Provider, MD   omeprazole (PRILOSEC) 20 MG delayed release capsule Take 20 mg by mouth daily   Yes Historical Provider, MD   potassium chloride (MICRO-K) 10 MEQ extended release capsule Take 10 mEq by mouth 2 times daily   Yes Historical Provider, MD   magnesium oxide (MAG-OX) 400 MG tablet Take 400 mg by mouth daily   Yes Historical Provider, MD   dexamethasone (DECADRON) 2 MG tablet Take 2 mg by mouth 2 times daily (with meals)   Yes Historical Provider, MD   morphine (MSIR) 30 MG tablet Take 30 mg by mouth 2 times daily. Yes Historical Provider, MD   OXYCODONE ER PO Take 5 mg by mouth 2 tabs qid   Yes Historical Provider, MD        Allergies:     Patient has no known allergies. Social History:     Tobacco:    reports that he has been smoking Cigarettes. He does not have any smokeless tobacco history on file. Alcohol:      reports that he drinks about 1.8 oz of alcohol per week . Drinks beer   Drug Use:  reports that he uses drugs, including Marijuana. Family History:     History reviewed. No pertinent family history. Patient denies stroke, heart disease or cancer in the immediate family      Review of Systems:     Positive and Negative as described in HPI. Review of Systems   Constitutional: Positive for malaise/fatigue and weight loss (not quantitated). Negative for chills and fever. Respiratory: Positive for cough and shortness of breath. Negative for hemoptysis, sputum production and wheezing. Cardiovascular: Negative for chest pain and palpitations. Gastrointestinal: Negative for heartburn, nausea and vomiting. Genitourinary: Negative for flank pain and hematuria. Musculoskeletal: Positive for back pain, joint pain and myalgias. Negative for falls.         Chronic bone pain from metastases  The patient has chronic arthralgias and myalgias     Neurological: Yes (L) 0.70 - 1.20 mg/dL    Bun/Cre Ratio 11 9 - 20    Calcium 7.1 (L) 8.6 - 10.4 mg/dL    Sodium 129 (L) 135 - 144 mmol/L    Potassium 2.4 (LL) 3.7 - 5.3 mmol/L    Chloride 88 (L) 98 - 107 mmol/L    CO2 25 20 - 31 mmol/L    Anion Gap 16 9 - 17 mmol/L    GFR Non-African American >60 >60 mL/min    GFR African American >60 >60 mL/min    GFR Comment          GFR Staging NOT REPORTED    CBC with DIFF    Collection Time: 03/16/18 10:24 PM   Result Value Ref Range    WBC 13.8 (H) 3.5 - 11.0 k/uL    RBC 2.69 (L) 4.5 - 5.9 m/uL    Hemoglobin 9.0 (L) 13.5 - 17.5 g/dL    Hematocrit 27.3 (L) 41 - 53 %    .3 (H) 80 - 100 fL    MCH 33.3 26 - 34 pg    MCHC 32.8 31 - 37 g/dL    RDW 14.9 (H) 11.5 - 14.5 %    Platelets 004 780 - 916 k/uL    MPV 5.8 (L) 6.0 - 12.0 fL    NRBC Automated NOT REPORTED per 100 WBC    Differential Type NOT REPORTED     Seg Neutrophils 77 (H) 36 - 66 %    Lymphocytes 17 (L) 24 - 44 %    Monocytes 6 1 - 7 %    Eosinophils % 0 (L) 1 - 4 %    Basophils 0 0 - 2 %    Immature Granulocytes NOT REPORTED 0 %    Segs Absolute 10.50 (H) 1.8 - 7.7 k/uL    Absolute Lymph # 2.40 1.0 - 4.8 k/uL    Absolute Mono # 0.80 0.2 - 0.8 k/uL    Absolute Eos # 0.00 0.0 - 0.4 k/uL    Basophils # 0.00 0.0 - 0.2 k/uL    Absolute Immature Granulocyte NOT REPORTED 0.00 - 0.30 k/uL    WBC Morphology NOT REPORTED     RBC Morphology NOT REPORTED     Platelet Estimate NOT REPORTED    Liver Profile    Collection Time: 03/16/18 10:24 PM   Result Value Ref Range    Alb 2.5 (L) 3.5 - 5.2 g/dL    Alkaline Phosphatase 299 (H) 40 - 129 U/L    ALT 57 (H) 5 - 41 U/L    AST 98 (H) <40 U/L    Total Bilirubin 0.41 0.3 - 1.2 mg/dL    Bilirubin, Direct 0.19 <0.31 mg/dL    Bilirubin, Indirect 0.22 0.00 - 1.00 mg/dL    Total Protein 5.0 (L) 6.4 - 8.3 g/dL    Globulin NOT REPORTED 1.5 - 3.8 g/dL    Albumin/Globulin Ratio NOT REPORTED 1.0 - 2.5   Magnesium    Collection Time: 03/16/18 10:24 PM   Result Value Ref Range    Magnesium 1.3 (L) 1.6 - 2.6 mg/dL   Phosphorus, Inorg. Collection Time: 03/16/18 10:24 PM   Result Value Ref Range    Phosphorus 1.8 (L) 2.5 - 4.5 mg/dL   Creat Kinase-MB Iso    Collection Time: 03/16/18 10:24 PM   Result Value Ref Range    Total CK 58 39 - 308 U/L    CK-MB 1.6 <10.5 ng/mL    % CKMB 2.8 0.0 - 3.5 %    CKMB Interpretation NORMAL ISOENZYME PATTERN    Trop/Myoglobin    Collection Time: 03/16/18 10:24 PM   Result Value Ref Range    Troponin T <0.03 <0.03 ng/mL    Troponin Interp          Myoglobin 42 28 - 72 ng/mL   Culture Blood #1    Collection Time: 03/16/18 10:24 PM   Result Value Ref Range    Specimen Description       . BLOOD 1ML RFA KB Performed at [address] [place] [address]    Specimen Description  [address] [phone]     Special Requests NOT REPORTED     Culture NO GROWTH 6 HOURS     Culture       Performed at [facility] [id number] [place] [address] [phone]    Status Pending    Culture Blood #1    Collection Time: 03/16/18 10:24 PM   Result Value Ref Range    Specimen Description       . BLOOD 2ML FAL KB Performed at [address] [place] [address]    Specimen Description  [address] [phone]     Special Requests NOT REPORTED     Culture NO GROWTH 6 HOURS     Culture       Performed at [address] [place] [address] [phone]    Status Pending    Lactic Acid    Collection Time: 03/16/18 10:35 PM   Result Value Ref Range    Lactic Acid 4.6 (H) 0.5 - 2.2 mmol/L   Ammonia    Collection Time: 03/16/18 10:35 PM   Result Value Ref Range    Ammonia 39 16 - 60 umol/L   UA W/REFLEX CULTURE    Collection Time: 03/17/18 12:39 AM   Result Value Ref Range    Color, UA YELLOW YEL    Turbidity UA CLEAR CLEAR    Glucose, Ur NEGATIVE NEG    Bilirubin Urine NEGATIVE NEG    Ketones, Urine NEGATIVE NEG    Specific Gravity, UA 1.003 (L) 1.005 - 1.030    Urine Hgb NEGATIVE NEG    pH, UA 7.0 5.0 - 8.0    Protein, UA NEGATIVE NEG